# Patient Record
(demographics unavailable — no encounter records)

---

## 2024-10-18 NOTE — HISTORY OF PRESENT ILLNESS
[FreeTextEntry1] : f/up for Type 2 diabetes mellitus  last A1c:  6.7%  Screening  Ophthalmology: follows LDL: lipitor 10mg po daily  Microalbumin: Cr:  +ve on ARB EGFR: 61   Current diabetic medication regimen (verified with patient):  metformin 1g po bid  glipizide ER 2.5mg daily  Jardiance stopped due to possible  effects  prior GI intolerance to ozempic    Ambulatory glucose profile (AGP):    Device: Dexcom G6  Glucose Management Indicator (GMI):  7%  Average Bmg/dl   TIR:   TIR >250 mg/dl:  3%  TIR >180mg/dl: 16%  TIR 70 to 180mg/dl: 81%  TIR <70mg/dl: 0%  TIR <54mg/dl: 0 %    Standard Deviation: 39mg/dl   Time (%) CGM active: 93%  AGP: at target  POCT : 109mg/dl  :

## 2024-10-18 NOTE — PHYSICAL EXAM
[Alert] : alert [Well Nourished] : well nourished [No Acute Distress] : no acute distress [Well Developed] : well developed [Normal Sclera/Conjunctiva] : normal sclera/conjunctiva [EOMI] : extra ocular movement intact [No Proptosis] : no proptosis [Normal Oropharynx] : the oropharynx was normal [Thyroid Not Enlarged] : the thyroid was not enlarged [No Respiratory Distress] : no respiratory distress [No Accessory Muscle Use] : no accessory muscle use [Clear to Auscultation] : lungs were clear to auscultation bilaterally [Normal S1, S2] : normal S1 and S2 [Normal Rate] : heart rate was normal [Regular Rhythm] : with a regular rhythm [No Edema] : no peripheral edema [Pedal Pulses Normal] : the pedal pulses are present [Normal Bowel Sounds] : normal bowel sounds [Not Tender] : non-tender [Not Distended] : not distended [Soft] : abdomen soft [Normal Anterior Cervical Nodes] : no anterior cervical lymphadenopathy [No Spinal Tenderness] : no spinal tenderness [Spine Straight] : spine straight [No Stigmata of Cushings Syndrome] : no stigmata of Cushings Syndrome [Normal Gait] : normal gait [Normal Strength/Tone] : muscle strength and tone were normal [No Rash] : no rash [Normal Reflexes] : deep tendon reflexes were 2+ and symmetric [No Tremors] : no tremors [Oriented x3] : oriented to person, place, and time [Acanthosis Nigricans] : no acanthosis nigricans [FreeTextEntry1] : some redness around foreskin near to head of penis along ventral shaft

## 2024-10-23 NOTE — ASSESSMENT
[FreeTextEntry1] : 65-year-old Mr. FISCHER is seen in consult for for anemia, Hgb 7.5, microcytic.  No h/o overt or occult loss. Last colonoscopy was 10 years ago.    [] Anemia (7.5), Microcytic (71.9) (initial presentation)  - Anemia w/u revealed KEENA, ferritin 17 - s/p 200mg IV iron sucrose x 5, repeat ferritin 230 but was done on same day of iron infusion - s/p GI evaluation w/ endoscopy, colonoscopy and pill capsule and treatment of H. Pylori in October/Nov 2023 w/ resolution of H. Pylori per pt  - Anemia resolved (Hgb 13.5 on 02/2024) but recurred (12.1 in 04/2024)    - CBC today shows Hgb 12.4 (7/12/24) - On PO iron and Folic acid once daily since 04/2024  - Hold PPI (may be responsible for element of decreased iron absorption, would like to confirm continued iron loss)  - CKD may be playing a role in his anemia  -Today's HGB 12.9 (10/23/24)<,Ferritin pending - Will hold off IV iron for now  - RTC in 3 months   [] CKG (stage III/IV)  - F/u with nephrology   *

## 2024-10-23 NOTE — RESULTS/DATA
[FreeTextEntry1] : 10/23/2024 HGB 12.9 MCV 89 Normal WBC and platelets  7/12/2024 HGB 12.4 < 12.5 MCV 89 < 94 Normal range WBC and PLT  Ferritin pending  Creatinine 1.5 (last visit)    4/22/2024 HGB 12.1 < 13.5 Iron studies pending  Last Ferritin 122 (01/2024)   1/11/24 CBC 13.3, MCV 84.7, normal WBC and platelets RDW increased to 21.4 iron studies pending  10/18/2023 Available labs reviewed.  HGB 7.5 < 13.5 (12/2022) MCV 71.9 Normal WBC and platelets

## 2024-10-23 NOTE — REVIEW OF SYSTEMS
[Diarrhea: Grade 0] : Diarrhea: Grade 0 [Negative] : Allergic/Immunologic [FreeTextEntry2] : fatigue, tired

## 2024-10-23 NOTE — HISTORY OF PRESENT ILLNESS
[de-identified] : 65 year -old Mr. FISCHER is seen in consult for anemia. His has had normal Hgb in 12/2022 (13.6) . His most recent HGB is 7.5 (10/12/2023). Anemia is microcytic. He denies any blood loss in the  or GI. He recently has lost 30 lbs INTENTIONALLY to reduce his blood sugar.  Patient lately has been feeing fatigued and tired, He has a sedentary lifestyle.  He is on PO iron by his PCP. His last colonoscopy was 10 years ago. He is waiting for GI appointment.  [de-identified] : 1/11/24:  Patient presents for f/u. Has right knee pain, had knee replacement in 2007, revision in 2009, now requiring another replacement 1/18/24. Reported to have seen GI from [facility], s/p endoscopy, colonoscopy, pill capsule in October after 1st visit which revealed H. Pylori, treated with antibiotics and successfully cleared. Also removed 1-2 polyps.   4/22/2024 Patient returns for a follow up for anemia. He was iron deficient and was treated with IV iron. His Hgb normalized but dropped again suggesting he is again losing or not absorbing iron. Of note he has been treated for H. Pylori too and reportedly cleared infection, He has no complaints.   7/12/2024 Patient presents for a follow up for anemia. He has been on PO iron and folic acid. However, his HGB remains ~12.5, MCV is decreasing (94 > 89) . He is supposedly clear of H. pylori. Of note his renal function has been noted to be deteriorating. The patient feels well.   10/23/2024 Patient presents for a follow up for anemia. Apparently, his Pylori has been cleared but he continues to lose iron as he is becoming anemic without PO Irom supplements. With PO iron his HGB improved from 12.4 to 12.9. He also has stage III kidney disease, which will impact his H/H. His endocrinologist has decreased his Metformin dose. The patient is otherwise feeling well. He is taking PO iron and folic acid.    *

## 2025-01-06 NOTE — DISCUSSION/SUMMARY
[FreeTextEntry1] : 65M with DM, HTN, HLD, mild AS  HTN: Better on recheck. Cont meds. Crt up but stable, caution with ARB, seeing renal  HLD: Lipids decent with statin, lifestyle changes. Continue statin, continue to encourage healthy lifestyle  DM: a1c 6.9%. Continue to watch sugar intake, weight loss, meds per endo, caution with metformin  Mild AS: Stable on last TTE. Soft murmur on exam. Repeat TTE   RV 6M

## 2025-01-06 NOTE — PHYSICAL EXAM

## 2025-01-06 NOTE — HISTORY OF PRESENT ILLNESS
[FreeTextEntry1] : 66M HTN, DM, HLD who presents for follow up  Pt with CKD, anemia, unclear etiology Seeing heme, nephrology Feels tired a lot Denies CP, dyspnea, lightheadedness Tolerated knee replacement in January 2024 Not as active lately, has been very busy with his autistic son  HISTORY:  Eventful few months, was anemic, GI workup notable for H. Pylori, s/p treatment, IV iron infusions with improvement in anemia  Former smoker. Retried- film industry, set director. Strong family hx of DM. He has an autistic son in his 20s.   ECG: SR, no ST-T wave changes TTE 7/2023:  1. Normal left ventricular cavity size. The left ventricular wall thickness is mildly increased. The left ventricular systolic function is normal with an ejection fraction of 69 % by Wilcox's method of disks. 2. Mild left ventricular hypertrophy. 3. There is normal left ventricular diastolic function. 4. Normal right ventricular cavity size, normal right ventricular wall thickness and normal right ventricular systolic function. The tricuspid annular plane systolic excursion (TAPSE) is 2.3 cm (normal >=1.7 cm). 5. The left atrium is normal. 6. The ascending aorta appears normal in size. The ascending aorta appears normal in size. 7. Mild calcification of the aortic valve leaflets. Mild aortic stenosis.  EST: 9:30, no ischemia, occasional PVC's near peak   Asa 81mg Atorvastatin 10mg Telmisartan 40mg  Metformin 500mg BID Glipizide 5mg

## 2025-01-10 NOTE — HISTORY OF PRESENT ILLNESS
[de-identified] : 65 year -old Mr. FISCHER is seen in consult for anemia. His has had normal Hgb in 12/2022 (13.6) . His most recent HGB is 7.5 (10/12/2023). Anemia is microcytic. He denies any blood loss in the  or GI. He recently has lost 30 lbs INTENTIONALLY to reduce his blood sugar.  Patient lately has been feeing fatigued and tired, He has a sedentary lifestyle.  He is on PO iron by his PCP. His last colonoscopy was 10 years ago. He is waiting for GI appointment.  [de-identified] : 1/11/24:  Patient presents for f/u. Has right knee pain, had knee replacement in 2007, revision in 2009, now requiring another replacement 1/18/24. Reported to have seen GI from [facility], s/p endoscopy, colonoscopy, pill capsule in October after 1st visit which revealed H. Pylori, treated with antibiotics and successfully cleared. Also removed 1-2 polyps.   4/22/2024 Patient returns for a follow up for anemia. He was iron deficient and was treated with IV iron. His Hgb normalized but dropped again suggesting he is again losing or not absorbing iron. Of note he has been treated for H. Pylori too and reportedly cleared infection, He has no complaints.   7/12/2024 Patient presents for a follow up for anemia. He has been on PO iron and folic acid. However, his HGB remains ~12.5, MCV is decreasing (94 > 89) . He is supposedly clear of H. pylori. Of note his renal function has been noted to be deteriorating. The patient feels well.   10/23/2024 Patient presents for a follow up for anemia. Apparently, his Pylori has been cleared but he continues to lose iron as he is becoming anemic without PO Irom supplements. With PO iron his HGB improved from 12.4 to 12.9. He also has stage III kidney disease, which will impact his H/H. His endocrinologist has decreased his Metformin dose. The patient is otherwise feeling well. He is taking PO iron and folic acid.   1/10/2025 Patient presents for a follow up for anemia secondary to CKD and iron deficiency. Had previously tested positive for H. Pylori that was resolved, still continued to be anemic back in October 2024. Retested for H. Pylori which was negative. Currently supplementing PO iron and folic acid. Tolerating without GI S.E. Today his Hgb is well improved to 13.2.  He continues to follow up with nephro and endo for CKD and diabetes. He feels well today

## 2025-01-10 NOTE — BEGINNING OF VISIT
[PHQ-2 Negative] : PHQ-2 Negative [Former] : Former [> 15 Years] : > 15 Years [With Patient/Caregiver] : with Patient/Caregiver

## 2025-01-10 NOTE — HISTORY OF PRESENT ILLNESS
[de-identified] : 65 year -old Mr. FISCHER is seen in consult for anemia. His has had normal Hgb in 12/2022 (13.6) . His most recent HGB is 7.5 (10/12/2023). Anemia is microcytic. He denies any blood loss in the  or GI. He recently has lost 30 lbs INTENTIONALLY to reduce his blood sugar.  Patient lately has been feeing fatigued and tired, He has a sedentary lifestyle.  He is on PO iron by his PCP. His last colonoscopy was 10 years ago. He is waiting for GI appointment.  [de-identified] : 1/11/24:  Patient presents for f/u. Has right knee pain, had knee replacement in 2007, revision in 2009, now requiring another replacement 1/18/24. Reported to have seen GI from [facility], s/p endoscopy, colonoscopy, pill capsule in October after 1st visit which revealed H. Pylori, treated with antibiotics and successfully cleared. Also removed 1-2 polyps.   4/22/2024 Patient returns for a follow up for anemia. He was iron deficient and was treated with IV iron. His Hgb normalized but dropped again suggesting he is again losing or not absorbing iron. Of note he has been treated for H. Pylori too and reportedly cleared infection, He has no complaints.   7/12/2024 Patient presents for a follow up for anemia. He has been on PO iron and folic acid. However, his HGB remains ~12.5, MCV is decreasing (94 > 89) . He is supposedly clear of H. pylori. Of note his renal function has been noted to be deteriorating. The patient feels well.   10/23/2024 Patient presents for a follow up for anemia. Apparently, his Pylori has been cleared but he continues to lose iron as he is becoming anemic without PO Irom supplements. With PO iron his HGB improved from 12.4 to 12.9. He also has stage III kidney disease, which will impact his H/H. His endocrinologist has decreased his Metformin dose. The patient is otherwise feeling well. He is taking PO iron and folic acid.   1/10/2025 Patient presents for a follow up for anemia secondary to CKD and iron deficiency. Had previously tested positive for H. Pylori that was resolved, still continued to be anemic back in October 2024. Retested for H. Pylori which was negative. Currently supplementing PO iron and folic acid. Tolerating without GI S.E. Today his Hgb is well improved to 13.2.  He continues to follow up with nephro and endo for CKD and diabetes. He feels well today

## 2025-01-10 NOTE — ASSESSMENT
[FreeTextEntry1] : 66-year-old Mr. FISCHER is seen in consult for for anemia, Hgb 7.5, microcytic.  No h/o overt or occult loss. S/p GI evaluation w/ endoscopy, colonoscopy and pill capsule and treatment of H. Pylori in October/Nov 2023 w/ resolution of H. Pylori.  [ ] Anemia (7.5), Microcytic (71.9) (initial presentation)  - Anemia w/u revealed KEENA, ferritin 17 - s/p 200mg IV iron sucrose x 5 (last dose Nov 2023) - s/p GI evaluation w/ endoscopy, colonoscopy and pill capsule and treatment of H. Pylori in October/Nov 2023 w/ resolution of H. Pylori per pt  - Anemia resolved (Hgb 13.5 on 02/2024) but recurred (12.1 in 04/2024)    - CBC today shows Hgb 12.4 (7/12/24) - On PO iron and Folic acid once daily since 04/2024  - Hold PPI (may be responsible for element of decreased iron absorption, would like to confirm continued iron loss)  - CKD may be playing a role in his anemia  -Today's HGB 13.2, pending ferritin, iron studies, CMP, and B12  - Can continue PO iron and folic acid  - Discussed the proper ways to take PO iron (on empty stomach, with Vit C)  [ ] CKD (stage III/IV)  - F/u with nephrology   RTC in 3-4 months Case and management discussed with Dr. Buckley

## 2025-01-10 NOTE — RESULTS/DATA
[FreeTextEntry1] : 1/10/25 Hgb 13.2, normal WBC and platelets Pending CMP, iron studies, ferritin, B12 and folate  10/23/2024 HGB 12.9 MCV 89 Normal WBC and platelets  7/12/2024 HGB 12.4 < 12.5 MCV 89 < 94 Normal range WBC and PLT  Ferritin pending  Creatinine 1.5 (last visit)    4/22/2024 HGB 12.1 < 13.5 Iron studies pending  Last Ferritin 122 (01/2024)   1/11/24 CBC 13.3, MCV 84.7, normal WBC and platelets RDW increased to 21.4 iron studies pending  10/18/2023 Available labs reviewed.  HGB 7.5 < 13.5 (12/2022) MCV 71.9 Normal WBC and platelets

## 2025-01-23 NOTE — HISTORY OF PRESENT ILLNESS
[FreeTextEntry1] : f/up for Type 2 diabetes mellitus  last A1c:  6.9%  Screening  Ophthalmology: follows LDL: lipitor 10mg po daily  Microalbumin: Cr:  +ve on ARB EGFR: 44   Current diabetic medication regimen (verified with patient):  metformin 500mg po bid  glipizide ER 2.5mg daily   Jardiance stopped due to possible  effects  prior GI intolerance to ozempic    Ambulatory glucose profile (AGP):    Device: Dexcom G6  Glucose Management Indicator (GMI):  7.5%  Average Bmg/dl   TIR:   TIR >250 mg/dl:  3%  TIR >180mg/dl: 37%  TIR 70 to 180mg/dl: 60%  TIR <70mg/dl: 0%  TIR <54mg/dl: 0%    Standard Deviation: 36mg/dl   Time (%) CGM active: 20.4%  AGP:  at target fasting. PP excursions after breakfast  Reports eating oatmeal recently, reduced exercise due to weather  Plans to modify diet   :

## 2025-05-08 NOTE — HISTORY OF PRESENT ILLNESS
[de-identified] : 65 year -old Mr. FISCHER is seen in consult for anemia. His has had normal Hgb in 12/2022 (13.6) . His most recent HGB is 7.5 (10/12/2023). Anemia is microcytic. He denies any blood loss in the  or GI. He recently has lost 30 lbs INTENTIONALLY to reduce his blood sugar.  Patient lately has been feeing fatigued and tired, He has a sedentary lifestyle.  He is on PO iron by his PCP. His last colonoscopy was 10 years ago. He is waiting for GI appointment.  [de-identified] : 1/11/24:  Patient presents for f/u. Has right knee pain, had knee replacement in 2007, revision in 2009, now requiring another replacement 1/18/24. Reported to have seen GI from [facility], s/p endoscopy, colonoscopy, pill capsule in October after 1st visit which revealed H. Pylori, treated with antibiotics and successfully cleared. Also removed 1-2 polyps.   4/22/2024 Patient returns for a follow up for anemia. He was iron deficient and was treated with IV iron. His Hgb normalized but dropped again suggesting he is again losing or not absorbing iron. Of note he has been treated for H. Pylori too and reportedly cleared infection, He has no complaints.   7/12/2024 Patient presents for a follow up for anemia. He has been on PO iron and folic acid. However, his HGB remains ~12.5, MCV is decreasing (94 > 89) . He is supposedly clear of H. pylori. Of note his renal function has been noted to be deteriorating. The patient feels well.   10/23/2024 Patient presents for a follow up for anemia. Apparently, his Pylori has been cleared but he continues to lose iron as he is becoming anemic without PO Irom supplements. With PO iron his HGB improved from 12.4 to 12.9. He also has stage III kidney disease, which will impact his H/H. His endocrinologist has decreased his Metformin dose. The patient is otherwise feeling well. He is taking PO iron and folic acid.   1/10/2025 Patient presents for a follow up for anemia secondary to CKD and iron deficiency. Had previously tested positive for H. Pylori that was resolved, still continued to be anemic back in October 2024. Retested for H. Pylori which was negative. Currently supplementing PO iron and folic acid. Tolerating without GI S.E. Today his Hgb is well improved to 13.2.  He continues to follow up with nephro and endo for CKD and diabetes. He feels well today   5/8/2025 Patient seen in follow up for anemia. He was Iron and B12 deficient that was replenished. His HGB improved to 13.2, however it dropped again to 12.3 (last visit). Patient complaints of feeling fatigued, especially in the afternoon and needs to take a nap. His labs from today are pending.    *

## 2025-05-08 NOTE — ASSESSMENT
[FreeTextEntry1] : 66-year-old Mr. FISCHER is seen in consult for for anemia, Hgb 7.5, microcytic.  No h/o overt or occult loss. S/p GI evaluation w/ endoscopy, colonoscopy and pill capsule and treatment of H. Pylori in October/Nov 2023 w/ resolution of H. Pylori.  [] Anemia (7.5), Microcytic (71.9) (initial presentation)  - Anemia w/u revealed KEENA, ferritin 17 (10/2023) - s/p 200mg IV iron sucrose x 5 (last dose Nov 2023) - s/p GI evaluation w/ endoscopy, colonoscopy and pill capsule and treatment of H. Pylori in October/Nov 2023 w/ resolution of H. Pylori per pt  - Anemia resolved (Hgb 13.5 on 02/2024) but recurred (12.1 in 04/2024)    - CBC shows Hgb 12.4 (7/12/24) - On PO iron and Folic acid once daily since 04/2024  - Hold PPI (may be responsible for element of decreased iron absorption, would like to confirm continued iron loss)  - CKD may be playing a role in his anemia  - Today's HGB 12.7 ferritin 156, Cr 1.85  - Can continue PO iron and folic acid  - Discussed the proper ways to take PO iron (on empty stomach, with Vit C)  [] CKD (stage III/IV)  - F/u with nephrology   RTC in 3-4 months   *

## 2025-05-08 NOTE — RESULTS/DATA
[FreeTextEntry1] : 5/8/2025 Labs pending  .7 MCV 91.5 Normal WBC and PLT  Ferritin 156   1/10/25 Hgb 13.2, normal WBC and platelets Pending CMP, iron studies, ferritin, B12 and folate  10/23/2024 HGB 12.9 MCV 89 Normal WBC and platelets  7/12/2024 HGB 12.4 < 12.5 MCV 89 < 94 Normal range WBC and PLT  Ferritin pending  Creatinine 1.5 (last visit)    4/22/2024 HGB 12.1 < 13.5 Iron studies pending  Last Ferritin 122 (01/2024)   1/11/24 CBC 13.3, MCV 84.7, normal WBC and platelets RDW increased to 21.4 iron studies pending  10/18/2023 Available labs reviewed.  HGB 7.5 < 13.5 (12/2022) MCV 71.9 Normal WBC and platelets

## 2025-05-08 NOTE — HISTORY OF PRESENT ILLNESS
[de-identified] : 65 year -old Mr. FISCHER is seen in consult for anemia. His has had normal Hgb in 12/2022 (13.6) . His most recent HGB is 7.5 (10/12/2023). Anemia is microcytic. He denies any blood loss in the  or GI. He recently has lost 30 lbs INTENTIONALLY to reduce his blood sugar.  Patient lately has been feeing fatigued and tired, He has a sedentary lifestyle.  He is on PO iron by his PCP. His last colonoscopy was 10 years ago. He is waiting for GI appointment.  [de-identified] : 1/11/24:  Patient presents for f/u. Has right knee pain, had knee replacement in 2007, revision in 2009, now requiring another replacement 1/18/24. Reported to have seen GI from [facility], s/p endoscopy, colonoscopy, pill capsule in October after 1st visit which revealed H. Pylori, treated with antibiotics and successfully cleared. Also removed 1-2 polyps.   4/22/2024 Patient returns for a follow up for anemia. He was iron deficient and was treated with IV iron. His Hgb normalized but dropped again suggesting he is again losing or not absorbing iron. Of note he has been treated for H. Pylori too and reportedly cleared infection, He has no complaints.   7/12/2024 Patient presents for a follow up for anemia. He has been on PO iron and folic acid. However, his HGB remains ~12.5, MCV is decreasing (94 > 89) . He is supposedly clear of H. pylori. Of note his renal function has been noted to be deteriorating. The patient feels well.   10/23/2024 Patient presents for a follow up for anemia. Apparently, his Pylori has been cleared but he continues to lose iron as he is becoming anemic without PO Irom supplements. With PO iron his HGB improved from 12.4 to 12.9. He also has stage III kidney disease, which will impact his H/H. His endocrinologist has decreased his Metformin dose. The patient is otherwise feeling well. He is taking PO iron and folic acid.   1/10/2025 Patient presents for a follow up for anemia secondary to CKD and iron deficiency. Had previously tested positive for H. Pylori that was resolved, still continued to be anemic back in October 2024. Retested for H. Pylori which was negative. Currently supplementing PO iron and folic acid. Tolerating without GI S.E. Today his Hgb is well improved to 13.2.  He continues to follow up with nephro and endo for CKD and diabetes. He feels well today   5/8/2025 Patient seen in follow up for anemia. He was Iron and B12 deficient that was replenished. His HGB improved to 13.2, however it dropped again to 12.3 (last visit). Patient complaints of feeling fatigued, especially in the afternoon and needs to take a nap. His labs from today are pending.    *

## 2025-05-14 NOTE — HISTORY OF PRESENT ILLNESS
[FreeTextEntry1] : f/up for Type 2 diabetes mellitus  last A1c:  7.2%  Screening  Podiatry: follows, Dr Ruby Ophthalmology: follows LDL: lipitor 10mg po daily  Microalbumin: Cr:  +ve on ARB EGFR: 40   Current diabetic medication regimen (verified with patient):  metformin 500mg po bid  glipizide ER 5mg daily   Jardiance stopped due to possible  effects  prior GI intolerance to ozempic    Ambulatory glucose profile (AGP):    Device: Dexcom G6  Glucose Management Indicator (GMI):  -%  Average Bmg/dl   TIR:   TIR >250 mg/dl:  18%  TIR >180mg/dl:  55%  TIR 70 to 180mg/dl: 27%  TIR <70mg/dl: 0%  TIR <54mg/dl: 0%    Standard Deviation: 45mg/dl   Time (%) CGM active: 21.5%  AGP:  above target   Current visit:   recent non-adherence to diet/ exercise.  Got "steroid shot: for pain :